# Patient Record
Sex: MALE | Race: WHITE | Employment: OTHER | URBAN - METROPOLITAN AREA
[De-identification: names, ages, dates, MRNs, and addresses within clinical notes are randomized per-mention and may not be internally consistent; named-entity substitution may affect disease eponyms.]

---

## 2017-02-16 RX ORDER — AMITRIPTYLINE HYDROCHLORIDE 25 MG/1
TABLET, FILM COATED ORAL
Qty: 30 TABLET | Refills: 0 | Status: SHIPPED | OUTPATIENT
Start: 2017-02-16 | End: 2017-03-13

## 2017-02-16 NOTE — TELEPHONE ENCOUNTER
Refill request for amitriptyline 25 mg, take 1 tab nightly, #30, no refills    LOV: 1/12/16  NOV: None

## 2017-03-13 RX ORDER — AMITRIPTYLINE HYDROCHLORIDE 25 MG/1
TABLET, FILM COATED ORAL
Qty: 30 TABLET | Refills: 0 | Status: SHIPPED | OUTPATIENT
Start: 2017-03-13 | End: 2017-04-19

## 2017-04-19 ENCOUNTER — PATIENT MESSAGE (OUTPATIENT)
Dept: INTERNAL MEDICINE CLINIC | Facility: CLINIC | Age: 47
End: 2017-04-19

## 2017-04-19 RX ORDER — AMITRIPTYLINE HYDROCHLORIDE 25 MG/1
TABLET, FILM COATED ORAL
Qty: 30 TABLET | Refills: 0 | Status: SHIPPED | OUTPATIENT
Start: 2017-04-19 | End: 2017-07-14

## 2017-04-19 NOTE — TELEPHONE ENCOUNTER
From: Mati Mcmahon  To: Ames Carrel, MD  Sent: 4/19/2017 4:30 PM CDT  Subject: Prescription Question    Good afternoon. Dr. Haleigh Martínez referred me to a Dr. Amy Dorsey for sleep apnea.  Dr. Amy Dorsey diagnosed me with periodic limb movement disorder instead and has had m

## 2017-04-19 NOTE — TELEPHONE ENCOUNTER
Per Dr. Maryse Ferguson written order \"refill prn, set dose + frequency from patient or pharmacy\". Patient is to see Dr. Brandon Mendez this summer. Attempt made to call patient and verify dose and frequency + pharmacy. No answer. LMTCB.

## 2017-04-19 NOTE — TELEPHONE ENCOUNTER
To Dr. Tae Barclay, please advise if you would like to refill carbidopa-levodopa or order referral for pt to f/u with Dr. Shari Sam?

## 2017-04-20 NOTE — TELEPHONE ENCOUNTER
Pt called back and verified dose of carbidopa-levodopa 25-100mg, 1 tab daily. eRx sent to pharm.  Call transferred to [facility] to schedule PE

## 2017-07-07 ENCOUNTER — TELEPHONE (OUTPATIENT)
Dept: INTERNAL MEDICINE CLINIC | Facility: CLINIC | Age: 47
End: 2017-07-07

## 2017-07-07 DIAGNOSIS — L98.9 SKIN ABNORMALITY: ICD-10-CM

## 2017-07-07 DIAGNOSIS — R23.4 CRACKED SKIN: Primary | ICD-10-CM

## 2017-07-07 NOTE — TELEPHONE ENCOUNTER
Pt requesting referral for appt  with Dr Flakita Rodríguez on Monday, 7/10/17 at 11:40  YPI#[phone]   Tasked to nursing

## 2017-07-07 NOTE — TELEPHONE ENCOUNTER
See 7/5/17 MyChart encounter. Referral generated and copy of the referral faxed to 's office.  To Dr.Cheff SALEH

## 2017-07-14 ENCOUNTER — OFFICE VISIT (OUTPATIENT)
Dept: INTERNAL MEDICINE CLINIC | Facility: CLINIC | Age: 47
End: 2017-07-14

## 2017-07-14 VITALS
WEIGHT: 232 LBS | DIASTOLIC BLOOD PRESSURE: 76 MMHG | BODY MASS INDEX: 30.75 KG/M2 | RESPIRATION RATE: 18 BRPM | HEART RATE: 62 BPM | OXYGEN SATURATION: 98 % | SYSTOLIC BLOOD PRESSURE: 118 MMHG | HEIGHT: 73 IN | TEMPERATURE: 99 F

## 2017-07-14 DIAGNOSIS — Z00.00 ANNUAL PHYSICAL EXAM: Primary | ICD-10-CM

## 2017-07-14 DIAGNOSIS — G47.61 PERIODIC LIMB MOVEMENT DISORDER: ICD-10-CM

## 2017-07-14 DIAGNOSIS — G47.30 SLEEP APNEA, UNSPECIFIED TYPE: ICD-10-CM

## 2017-07-14 DIAGNOSIS — F09 COGNITIVE DISORDER: ICD-10-CM

## 2017-07-14 PROCEDURE — G0438 PPPS, INITIAL VISIT: HCPCS | Performed by: INTERNAL MEDICINE

## 2017-07-14 PROCEDURE — 99396 PREV VISIT EST AGE 40-64: CPT | Performed by: INTERNAL MEDICINE

## 2017-07-14 RX ORDER — FLUOCINONIDE 0.5 MG/G
OINTMENT TOPICAL
Refills: 0 | COMMUNITY
Start: 2017-07-11 | End: 2017-10-12

## 2017-07-14 RX ORDER — OXICONAZOLE NITRATE 10 MG/G
CREAM TOPICAL
Refills: 0 | COMMUNITY
Start: 2017-07-13 | End: 2017-07-14

## 2017-07-14 RX ORDER — FINASTERIDE 5 MG/1
5 TABLET, FILM COATED ORAL DAILY
Refills: 0 | COMMUNITY
Start: 2017-07-10 | End: 2017-10-12

## 2017-07-14 NOTE — PROGRESS NOTES
Carlos Aragon is a 41year old male. HPI:   1. Annual physical exam - he is feeling much better generally since his last visit here about 2 yrs ago.  He had seen Dr Jeanie Mckenzie and was given Sinemet for periodic limb movement disorder and pt stopped this after Smokeless tobacco: Never Used                      Alcohol use:  No                 REVIEW OF SYSTEMS:   GENERAL HEALTH: feels well otherwise  SKIN: denies any unusual skin lesions or rashes  RESPIRATORY: denies shortness of breath with exertio (PHQ-2/PHQ-9): Over the LAST 2 WEEKS   Little interest or pleasure in doing things (over the last two weeks)?: Not at all    Feeling down, depressed, or hopeless (over the last two weeks)?: Not at all    PHQ-2 SCORE: 0        Advance Directives this or any previous visit. Update Immunization Activity if applicable    Tetanus No orders found for this or any previous visit.  Update Immunization Activity if applicable    Zoster (Not covered by Medicare Part B) No orders found for this or any previous

## 2017-07-15 ENCOUNTER — TELEPHONE (OUTPATIENT)
Dept: INTERNAL MEDICINE CLINIC | Facility: CLINIC | Age: 47
End: 2017-07-15

## 2017-07-15 NOTE — TELEPHONE ENCOUNTER
Please call Dr Payal Lieberman office. Can they check Dr Payal Lieberman notes: patient states that Dr Viviana Chavez told him he does not have sleep apnea, he has not been using CPAP for about 2 yrs.  I'm looking for clarification as to whether or not Dr Viviana Chavez feels he has sleep apnea or not

## 2017-07-17 NOTE — TELEPHONE ENCOUNTER
Called Dr. Sheng Stafford office. I was informed that patient hasn't been seen since 9/25/2015. I requested that MD's last progress note be faxed to our office. Note will be faxed today. Slim made aware to place on Dr. Campos Nations desk for review.      THERESE Garber

## 2017-08-03 NOTE — TELEPHONE ENCOUNTER
Pt can no longer see  because he is not in network. Pt needs a referral to see another neurologist. He also needs a refill on Amitriptyline 25 mg, 1 a day. (prescribed by ). Pt uses [employer].     Tasked to Nursing

## 2017-08-03 NOTE — TELEPHONE ENCOUNTER
Amitriptyline was DC'd on 7/14/2017, Okay to refill ?    Referral pended, please advise which neurologist you would like to refer to ?

## 2017-08-05 NOTE — TELEPHONE ENCOUNTER
Refill amitriptyline 25 mg, # 30, one at [facility], refill x 6. See me in 6 mo. Give neuro referral to whomever is in his network.

## 2017-08-07 NOTE — TELEPHONE ENCOUNTER
Called patient and relayed DR. EDWARDS message - verbalized understanding.  Referral for neuro pending - instructed patient to call insurance to find out who is covered - patient will call back

## 2017-10-11 NOTE — TELEPHONE ENCOUNTER
spoketo pt - per  note pt no longer takes;   Per pt he does still have \"bouts\" of periodic limb movement and he takes Sinemet;   He just picked up refill this week and does not need med currently

## 2017-10-12 ENCOUNTER — APPOINTMENT (OUTPATIENT)
Dept: LAB | Age: 47
End: 2017-10-12
Attending: INTERNAL MEDICINE
Payer: COMMERCIAL

## 2017-10-12 ENCOUNTER — OFFICE VISIT (OUTPATIENT)
Dept: INTERNAL MEDICINE CLINIC | Facility: CLINIC | Age: 47
End: 2017-10-12

## 2017-10-12 VITALS
WEIGHT: 222.81 LBS | DIASTOLIC BLOOD PRESSURE: 76 MMHG | HEART RATE: 72 BPM | OXYGEN SATURATION: 98 % | SYSTOLIC BLOOD PRESSURE: 112 MMHG | BODY MASS INDEX: 29.53 KG/M2 | HEIGHT: 73 IN | TEMPERATURE: 98 F

## 2017-10-12 DIAGNOSIS — Z00.00 ANNUAL PHYSICAL EXAM: ICD-10-CM

## 2017-10-12 DIAGNOSIS — G47.61 PERIODIC LIMB MOVEMENT DISORDER: ICD-10-CM

## 2017-10-12 DIAGNOSIS — N40.0 BENIGN PROSTATIC HYPERPLASIA, UNSPECIFIED WHETHER LOWER URINARY TRACT SYMPTOMS PRESENT: ICD-10-CM

## 2017-10-12 DIAGNOSIS — L73.9 FOLLICULITIS: Primary | ICD-10-CM

## 2017-10-12 PROCEDURE — 36415 COLL VENOUS BLD VENIPUNCTURE: CPT

## 2017-10-12 PROCEDURE — 99214 OFFICE O/P EST MOD 30 MIN: CPT | Performed by: INTERNAL MEDICINE

## 2017-10-12 PROCEDURE — 90471 IMMUNIZATION ADMIN: CPT | Performed by: INTERNAL MEDICINE

## 2017-10-12 PROCEDURE — 81015 MICROSCOPIC EXAM OF URINE: CPT

## 2017-10-12 PROCEDURE — 90686 IIV4 VACC NO PRSV 0.5 ML IM: CPT | Performed by: INTERNAL MEDICINE

## 2017-10-12 PROCEDURE — 99212 OFFICE O/P EST SF 10 MIN: CPT | Performed by: INTERNAL MEDICINE

## 2017-10-12 RX ORDER — AMITRIPTYLINE HYDROCHLORIDE 25 MG/1
25 TABLET, FILM COATED ORAL NIGHTLY
Qty: 30 TABLET | Refills: 5 | Status: SHIPPED | OUTPATIENT
Start: 2017-10-12 | End: 2018-04-10

## 2017-10-12 RX ORDER — FINASTERIDE 5 MG/1
5 TABLET, FILM COATED ORAL DAILY
Qty: 30 TABLET | Refills: 5 | Status: SHIPPED | OUTPATIENT
Start: 2017-10-12 | End: 2019-02-08

## 2017-10-12 RX ORDER — SULFAMETHOXAZOLE AND TRIMETHOPRIM 800; 160 MG/1; MG/1
1 TABLET ORAL 2 TIMES DAILY
Qty: 20 TABLET | Refills: 0 | Status: SHIPPED | OUTPATIENT
Start: 2017-10-12 | End: 2018-08-06

## 2017-12-27 ENCOUNTER — PATIENT MESSAGE (OUTPATIENT)
Dept: INTERNAL MEDICINE CLINIC | Facility: CLINIC | Age: 47
End: 2017-12-27

## 2017-12-27 NOTE — TELEPHONE ENCOUNTER
From: Carlos Aragon  To: Darcy Dietrich MD  Sent: 12/27/2017 12:47 PM CST  Subject: Prescription Question    Good afternoon, Dr. Ines Payton. Happy holidays. I have been down with a pretty bad sinus infection since Christmas Eve.  I've been taking Maximum Str

## 2017-12-27 NOTE — TELEPHONE ENCOUNTER
Sabra Basurto   to Dia Sanchez MD           12/27/17 12:47 PM   Good afternoon, Dr. Joss Hopkins. Happy holidays. I have been down with a pretty bad sinus infection since Christmas Eve.  I've been taking Maximum Strength Mucinex to help alleviate the symptom

## 2017-12-27 NOTE — TELEPHONE ENCOUNTER
Please advise - haider-smooth shows high warning  -pending , also cough medicine due to allergy to erythromycin. Patient states Carbidopa was first given by DR. Vivar for periodic limb movement -refilled to DR. EDWARDS

## 2017-12-27 NOTE — TELEPHONE ENCOUNTER
1) DANIEL rebollar; if he is coughing then phen c cod    2) refill carbidopa as ordered - ask him who first gave him this and for what purpose. Can refill for 1 yr if answer sound reasonable.

## 2017-12-28 RX ORDER — PROMETHAZINE HYDROCHLORIDE AND CODEINE PHOSPHATE 6.25; 1 MG/5ML; MG/5ML
2.5 SYRUP ORAL EVERY 4 HOURS PRN
Qty: 180 ML | Refills: 0 | COMMUNITY
Start: 2017-12-27 | End: 2018-08-06

## 2017-12-28 RX ORDER — DOXYCYCLINE HYCLATE 100 MG/1
100 CAPSULE ORAL 2 TIMES DAILY
Qty: 14 CAPSULE | Refills: 0 | Status: SHIPPED | OUTPATIENT
Start: 2017-12-28 | End: 2018-08-06

## 2017-12-28 RX ORDER — AZITHROMYCIN 250 MG/1
TABLET, FILM COATED ORAL
Qty: 6 TABLET | Refills: 0 | Status: CANCELLED | OUTPATIENT
Start: 2017-12-28

## 2017-12-28 NOTE — TELEPHONE ENCOUNTER
Pt is checking on the status of medication that was supposed to be called  Pt said he is not trying to be a nuisance he has a family member that will pick it up today  Please call pt [phone]    Tasked to nursing high

## 2017-12-28 NOTE — TELEPHONE ENCOUNTER
Spoke to pt - he confirms he has severe rxn to Erythromycin and does not remember taking Elmer Bun in 2015 therefore we cannot send in Elmer Bun;   Pt has not had issues with Doxycycline in the past;   Rx changed and discussed  With pt

## 2018-04-10 RX ORDER — AMITRIPTYLINE HYDROCHLORIDE 25 MG/1
25 TABLET, FILM COATED ORAL NIGHTLY
Qty: 90 TABLET | Refills: 3 | Status: SHIPPED | OUTPATIENT
Start: 2018-04-10 | End: 2019-02-18

## 2018-04-10 NOTE — TELEPHONE ENCOUNTER
Patient called to say that he is leaving for out of town first thing Thursday morning, therefore he needs this done by tomorrow afternoon.

## 2018-06-29 NOTE — TELEPHONE ENCOUNTER
Please advise - called patient who states he needs referral for psychologist . He has PLM and has cognitive impairment and needs psychologist for help -referral is pending . He can only see one associated with [facility] , also needs DX thanks - to DR. EDWARDS

## 2018-06-29 NOTE — TELEPHONE ENCOUNTER
Pt would like to change their diagnosis after talking to [place]. Best call back is [phone]. Tasked to nursing.

## 2018-08-13 ENCOUNTER — PATIENT MESSAGE (OUTPATIENT)
Dept: INTERNAL MEDICINE CLINIC | Facility: CLINIC | Age: 48
End: 2018-08-13

## 2018-08-15 NOTE — TELEPHONE ENCOUNTER
From: Ervin Jarquin  To: Yordy Rodas MD  Sent: 8/13/2018 1:06 PM CDT  Subject: Other    Dear member of Dr. Logan Jameson office:    I have a favor to ask regarding adding some absent information to my EMR chart.     Currently, my electronic records in [address] in approximately 2012. It was from data in the very first sleep study conducted in or around 2008 that I provided in 2016 to Dr. Jennifer Morton who then noticed readings that led him to believe I have PLM and not sleep apnea.     When time permits, if you could update

## 2019-02-04 ENCOUNTER — OFFICE VISIT (OUTPATIENT)
Dept: INTERNAL MEDICINE CLINIC | Facility: CLINIC | Age: 49
End: 2019-02-04

## 2019-02-04 ENCOUNTER — PATIENT MESSAGE (OUTPATIENT)
Dept: INTERNAL MEDICINE CLINIC | Facility: CLINIC | Age: 49
End: 2019-02-04

## 2019-02-04 ENCOUNTER — LAB ENCOUNTER (OUTPATIENT)
Dept: LAB | Age: 49
End: 2019-02-04
Attending: INTERNAL MEDICINE
Payer: COMMERCIAL

## 2019-02-04 VITALS
SYSTOLIC BLOOD PRESSURE: 110 MMHG | DIASTOLIC BLOOD PRESSURE: 78 MMHG | OXYGEN SATURATION: 98 % | BODY MASS INDEX: 29.03 KG/M2 | HEART RATE: 81 BPM | WEIGHT: 219 LBS | TEMPERATURE: 98 F | HEIGHT: 73 IN

## 2019-02-04 DIAGNOSIS — R10.13 EPIGASTRIC PAIN: ICD-10-CM

## 2019-02-04 DIAGNOSIS — Z00.00 ANNUAL PHYSICAL EXAM: Primary | ICD-10-CM

## 2019-02-04 DIAGNOSIS — R73.9 ELEVATED BLOOD SUGAR: ICD-10-CM

## 2019-02-04 DIAGNOSIS — Z00.00 ANNUAL PHYSICAL EXAM: ICD-10-CM

## 2019-02-04 DIAGNOSIS — G47.61 PERIODIC LIMB MOVEMENT DISORDER: ICD-10-CM

## 2019-02-04 DIAGNOSIS — R07.89 OTHER CHEST PAIN: ICD-10-CM

## 2019-02-04 LAB
ALBUMIN SERPL BCP-MCNC: 4.1 G/DL (ref 3.5–4.8)
ALBUMIN/GLOB SERPL: 1.3 {RATIO} (ref 1–2)
ALP SERPL-CCNC: 41 U/L (ref 32–100)
ALT SERPL-CCNC: 22 U/L (ref 17–63)
ANION GAP SERPL CALC-SCNC: 9 MMOL/L (ref 0–18)
AST SERPL-CCNC: 26 U/L (ref 15–41)
BACTERIA UR QL AUTO: NEGATIVE /HPF
BASOPHILS # BLD AUTO: 0.04 X10(3) UL (ref 0–0.2)
BASOPHILS NFR BLD AUTO: 0.8 %
BILIRUB SERPL-MCNC: 0.6 MG/DL (ref 0.3–1.2)
BILIRUB UR QL: NEGATIVE
BUN SERPL-MCNC: 11 MG/DL (ref 8–20)
BUN/CREAT SERPL: 13.8 (ref 10–20)
CALCIUM SERPL-MCNC: 9.2 MG/DL (ref 8.5–10.5)
CHLORIDE SERPL-SCNC: 102 MMOL/L (ref 95–110)
CHOLEST SERPL-MCNC: 183 MG/DL (ref 110–200)
CLARITY UR: CLEAR
CO2 SERPL-SCNC: 28 MMOL/L (ref 22–32)
COLOR UR: YELLOW
COMPLEXED PSA SERPL-MCNC: 0.2 NG/ML (ref 0.01–4)
CREAT SERPL-MCNC: 0.8 MG/DL (ref 0.5–1.5)
DEPRECATED RDW RBC AUTO: 39.9 FL (ref 35.1–46.3)
EOSINOPHIL # BLD AUTO: 0.08 X10(3) UL (ref 0–0.7)
EOSINOPHIL NFR BLD AUTO: 1.6 %
ERYTHROCYTE [DISTWIDTH] IN BLOOD BY AUTOMATED COUNT: 11.5 % (ref 11–15)
EST. AVERAGE GLUCOSE BLD GHB EST-MCNC: 108 MG/DL (ref 68–126)
GLOBULIN PLAS-MCNC: 3.2 G/DL (ref 2.5–3.7)
GLUCOSE SERPL-MCNC: 111 MG/DL (ref 70–99)
GLUCOSE UR-MCNC: NEGATIVE MG/DL
HBA1C MFR BLD HPLC: 5.4 % (ref ?–5.7)
HCT VFR BLD AUTO: 44.5 % (ref 39–53)
HDLC SERPL-MCNC: 52 MG/DL
HGB BLD-MCNC: 14.7 G/DL (ref 13–17.5)
HGB UR QL STRIP.AUTO: NEGATIVE
IMM GRANULOCYTES # BLD AUTO: 0.01 X10(3) UL (ref 0–1)
IMM GRANULOCYTES NFR BLD: 0.2 %
KETONES UR-MCNC: NEGATIVE MG/DL
LDLC SERPL CALC-MCNC: 124 MG/DL (ref 0–99)
LEUKOCYTE ESTERASE UR QL STRIP.AUTO: NEGATIVE
LYMPHOCYTES # BLD AUTO: 1.44 X10(3) UL (ref 1–4)
LYMPHOCYTES NFR BLD AUTO: 29.5 %
MCH RBC QN AUTO: 31.5 PG (ref 26–34)
MCHC RBC AUTO-ENTMCNC: 33 G/DL (ref 31–37)
MCV RBC AUTO: 95.3 FL (ref 80–100)
MONOCYTES # BLD AUTO: 0.3 X10(3) UL (ref 0.1–1)
MONOCYTES NFR BLD AUTO: 6.1 %
NEUTROPHILS # BLD AUTO: 3.01 X10 (3) UL (ref 1.5–7.7)
NEUTROPHILS # BLD AUTO: 3.01 X10(3) UL (ref 1.5–7.7)
NEUTROPHILS NFR BLD AUTO: 61.8 %
NITRITE UR QL STRIP.AUTO: NEGATIVE
NONHDLC SERPL-MCNC: 131 MG/DL
OSMOLALITY UR CALC.SUM OF ELEC: 288 MOSM/KG (ref 275–295)
PATIENT FASTING: YES
PH UR: 5 [PH] (ref 5–8)
PLATELET # BLD AUTO: 246 10(3)UL (ref 150–450)
POTASSIUM SERPL-SCNC: 4.8 MMOL/L (ref 3.3–5.1)
PROT SERPL-MCNC: 7.3 G/DL (ref 5.9–8.4)
PROT UR-MCNC: NEGATIVE MG/DL
PSA SERPL-MCNC: 0.2 NG/ML (ref 0–4)
RBC # BLD AUTO: 4.67 X10(6)UL (ref 4.3–5.7)
RBC #/AREA URNS AUTO: 1 /HPF
SODIUM SERPL-SCNC: 139 MMOL/L (ref 136–144)
SP GR UR STRIP: 1.01 (ref 1–1.03)
TRIGL SERPL-MCNC: 33 MG/DL (ref 1–149)
TSH SERPL-ACNC: 0.62 UIU/ML (ref 0.45–5.33)
UROBILINOGEN UR STRIP-ACNC: <2
VIT C UR-MCNC: NEGATIVE MG/DL
WBC # BLD AUTO: 4.9 X10(3) UL (ref 4–11)
WBC #/AREA URNS AUTO: 0 /HPF

## 2019-02-04 PROCEDURE — 80053 COMPREHEN METABOLIC PANEL: CPT

## 2019-02-04 PROCEDURE — 80061 LIPID PANEL: CPT

## 2019-02-04 PROCEDURE — 36415 COLL VENOUS BLD VENIPUNCTURE: CPT

## 2019-02-04 PROCEDURE — 99396 PREV VISIT EST AGE 40-64: CPT | Performed by: INTERNAL MEDICINE

## 2019-02-04 PROCEDURE — 85025 COMPLETE CBC W/AUTO DIFF WBC: CPT

## 2019-02-04 PROCEDURE — 81015 MICROSCOPIC EXAM OF URINE: CPT

## 2019-02-04 PROCEDURE — 81001 URINALYSIS AUTO W/SCOPE: CPT

## 2019-02-04 PROCEDURE — 84443 ASSAY THYROID STIM HORMONE: CPT

## 2019-02-04 PROCEDURE — 83036 HEMOGLOBIN GLYCOSYLATED A1C: CPT

## 2019-02-04 NOTE — TELEPHONE ENCOUNTER
From: Anthony Fletcher  To: Catherine Lofton MD  Sent: 2/4/2019 11:57 AM CST  Subject: Prescription Question    Dear Dr. Leo Ponce,    In light of the recent gastric and chest pains, I completely forgot to mention one of the original reasons I made the appointment

## 2019-02-04 NOTE — PROGRESS NOTES
Fernando Arrieta is a 57year old male. HPI:   He is here for annual physical.     He has had epigastric pain for the last 2 weeks, almost daily, 4/10 - no NVD. Eating does not change pain. Pain can make it difficult to sleep.      He has questions about varico (1.854 m)   Wt 219 lb (99.3 kg)   SpO2 98%   BMI 28.89 kg/m²   GENERAL: well developed, well nourished,in no apparent distress  SKIN: no rashes,no suspicious lesions  HEENT: atraumatic, normocephalic,ears and throat are clear  NECK: supple,no adenopathy,no

## 2019-02-08 ENCOUNTER — PATIENT MESSAGE (OUTPATIENT)
Dept: INTERNAL MEDICINE CLINIC | Facility: CLINIC | Age: 49
End: 2019-02-08

## 2019-02-08 RX ORDER — FINASTERIDE 5 MG/1
5 TABLET, FILM COATED ORAL DAILY
Qty: 90 TABLET | Refills: 3 | Status: SHIPPED | OUTPATIENT
Start: 2019-02-08 | End: 2020-02-24

## 2019-02-08 NOTE — TELEPHONE ENCOUNTER
From: Elizabeth Dawson  To: Vivian Gomes MD  Sent: 2/8/2019 3:17 PM CST  Subject: Prescription Question    Dear Laurette Seip RN:    Per your request, at this time, I am contacting you to request the following Rx refills through [address] [place] [address] ([address]

## 2019-02-09 ENCOUNTER — TELEPHONE (OUTPATIENT)
Dept: INTERNAL MEDICINE CLINIC | Facility: CLINIC | Age: 49
End: 2019-02-09

## 2019-02-11 NOTE — TELEPHONE ENCOUNTER
Spoke to patient and relayed MD message, patient verbalizes understanding. Patient inquired about another message he had sent via email for refills on his carbidopa-levodopa and finasteride.  Per email on 2/8/19, It looks as though refills for those [facility]

## 2019-02-20 RX ORDER — AMITRIPTYLINE HYDROCHLORIDE 25 MG/1
25 TABLET, FILM COATED ORAL NIGHTLY
Qty: 90 TABLET | Refills: 3 | Status: SHIPPED | OUTPATIENT
Start: 2019-02-20 | End: 2019-08-15

## 2019-03-21 NOTE — TELEPHONE ENCOUNTER
Pt called re: carbidopa - levodopa prescription  Has no available refills & is out of medication  States he needs refill today - \"is really hurting\"  Had his physical w/Dr Pandey on 2-4-19 & thought Rx was sent in at that time with additional refills    P

## 2019-06-03 ENCOUNTER — TELEPHONE (OUTPATIENT)
Dept: INTERNAL MEDICINE CLINIC | Facility: CLINIC | Age: 49
End: 2019-06-03

## 2019-08-15 NOTE — PROGRESS NOTES
Rob Melenedz is a 54year old male. Patient presents with: Anxiety: Here today to discuss recent anxiety / stress has increased, caring for family.  Also noticing difficulty sleeping, headaches etc.       HPI:   Rob Melendez is a 54year old male who presents (99.8 kg)   SpO2 98%   BMI 29.03 kg/m²     GENERAL: well developed, well nourished, in no apparent distress      ASSESSMENT AND PLAN:     1.  Anxiety  Advised to increase his elavil t 50mg nightly as this can help with sleep and also with migraine preventio

## 2019-10-08 ENCOUNTER — OFFICE VISIT (OUTPATIENT)
Dept: INTERNAL MEDICINE CLINIC | Facility: CLINIC | Age: 49
End: 2019-10-08

## 2019-10-08 ENCOUNTER — LAB ENCOUNTER (OUTPATIENT)
Dept: LAB | Age: 49
End: 2019-10-08
Attending: INTERNAL MEDICINE
Payer: COMMERCIAL

## 2019-10-08 VITALS
BODY MASS INDEX: 28.49 KG/M2 | SYSTOLIC BLOOD PRESSURE: 110 MMHG | OXYGEN SATURATION: 98 % | TEMPERATURE: 98 F | DIASTOLIC BLOOD PRESSURE: 78 MMHG | WEIGHT: 215 LBS | HEIGHT: 73 IN | HEART RATE: 81 BPM

## 2019-10-08 DIAGNOSIS — R10.13 EPIGASTRIC PAIN: ICD-10-CM

## 2019-10-08 DIAGNOSIS — R10.13 EPIGASTRIC PAIN: Primary | ICD-10-CM

## 2019-10-08 PROCEDURE — 90471 IMMUNIZATION ADMIN: CPT | Performed by: INTERNAL MEDICINE

## 2019-10-08 PROCEDURE — 99214 OFFICE O/P EST MOD 30 MIN: CPT | Performed by: INTERNAL MEDICINE

## 2019-10-08 PROCEDURE — 80053 COMPREHEN METABOLIC PANEL: CPT

## 2019-10-08 PROCEDURE — 85025 COMPLETE CBC W/AUTO DIFF WBC: CPT

## 2019-10-08 PROCEDURE — 90686 IIV4 VACC NO PRSV 0.5 ML IM: CPT | Performed by: INTERNAL MEDICINE

## 2019-10-08 PROCEDURE — 36415 COLL VENOUS BLD VENIPUNCTURE: CPT

## 2019-10-08 RX ORDER — PANTOPRAZOLE SODIUM 40 MG/1
40 TABLET, DELAYED RELEASE ORAL
Qty: 30 TABLET | Refills: 2 | Status: SHIPPED | OUTPATIENT
Start: 2019-10-08 | End: 2020-02-26

## 2019-10-08 NOTE — PROGRESS NOTES
Re Roman is a 54year old male. HPI:   He was here in Feb with epigastric pain and CT and upper endo was rec but pt did not follow through. He has had variable discomfort since then - beer seems to make it worse;  occas nocturnal pain. No NVD.  During t Alcohol/week: 0.0 standard drinks    Drug use: No       REVIEW OF SYSTEMS:   GENERAL HEALTH: feels well otherwise  SKIN: denies any unusual skin lesions or rashes  RESPIRATORY: denies shortness of breath with exertion  CARDIOVASCULAR: denies chest pain on

## 2019-10-09 ENCOUNTER — TELEPHONE (OUTPATIENT)
Dept: INTERNAL MEDICINE CLINIC | Facility: CLINIC | Age: 49
End: 2019-10-09

## 2019-10-09 DIAGNOSIS — Z12.11 SCREENING FOR COLON CANCER: Primary | ICD-10-CM

## 2019-10-09 NOTE — TELEPHONE ENCOUNTER
Called and Relayed MD's message to patient---verbalized understanding  THERESE: he plans to drop off hemoccult card late this week or early next week. He will include an envelope for Dr. Jocelin Nance that contains photos of his bowel movements.

## 2019-10-12 ENCOUNTER — TELEPHONE (OUTPATIENT)
Dept: INTERNAL MEDICINE CLINIC | Facility: CLINIC | Age: 49
End: 2019-10-12

## 2019-10-16 ENCOUNTER — APPOINTMENT (OUTPATIENT)
Dept: LAB | Age: 49
End: 2019-10-16
Attending: INTERNAL MEDICINE
Payer: COMMERCIAL

## 2019-10-16 DIAGNOSIS — Z12.11 SCREENING FOR COLON CANCER: ICD-10-CM

## 2019-10-16 PROCEDURE — 82270 OCCULT BLOOD FECES: CPT

## 2019-10-17 NOTE — TELEPHONE ENCOUNTER
Macy Robertson / [address] [facility] EXT [address]  Pt dropped off Hemocult Card at the lab there is no order  Please enter order and call Macy Robertson when complete  [phone]    Tasked to nursing high

## 2019-10-25 ENCOUNTER — TELEPHONE (OUTPATIENT)
Dept: INTERNAL MEDICINE CLINIC | Facility: CLINIC | Age: 49
End: 2019-10-25

## 2019-10-25 NOTE — TELEPHONE ENCOUNTER
I spoke with patient and relayed Dr. Reed Dumont message. He verbalized understanding. He asked why he should have the CT done. He asked for a little more information from Dr. Dearl Ormond to understand why a CT is needed at this time.  He is concerned about the cost

## 2019-10-28 NOTE — TELEPHONE ENCOUNTER
I ordered CT abdo because of his abdo discomfort. Cheapest impaging is [facility], [place], let me know and I will fax order.  CT should be in range of $300

## 2019-10-29 NOTE — TELEPHONE ENCOUNTER
Patient called and relayed Dr Gordon Guerra message. Patient verbalized understanding and will call [facility] and inquire about pricing prior to scheduling CT. CT order faxed to [facility] in [facility]. Phone # [phone].  Patient re

## 2019-10-31 NOTE — TELEPHONE ENCOUNTER
To [facility] care - [facility] called Mady  Who states the referral for CT has to be made pending . She can be reached at [phone] or [phone].  Patient knows about this , he also will go to [facility] or [facility] - please let patient and clinica

## 2019-10-31 NOTE — TELEPHONE ENCOUNTER
Managed Care, does patient need PA for CT abdomen+pelvis ordered 2/4/19? If so can we get this started.  Thank you

## 2019-10-31 NOTE — TELEPHONE ENCOUNTER
Please note that patient has [facility] HMO and is unable to have CT done outside of [address] [place] [address]. Please advise if patient will be paying out of pocket, if so patient will only need an order. Thank you, David Garcia Specialist    Managed Care.

## 2019-11-01 NOTE — TELEPHONE ENCOUNTER
Spoke to Ty in [facility]. Referral is not needed for CTs. Patient only needs order and go to [facility] AND [facility] facilities.

## 2019-11-03 ENCOUNTER — PATIENT MESSAGE (OUTPATIENT)
Dept: INTERNAL MEDICINE CLINIC | Facility: CLINIC | Age: 49
End: 2019-11-03

## 2019-11-04 NOTE — TELEPHONE ENCOUNTER
S/w patient and relayed imani message below. He states that he has been calling his insurance and has been getting different answers r/t cost. He was told that he cannot go to [facility] as it is out of network.  He is also being told that [address] [place] [address] is coded

## 2019-11-04 NOTE — TELEPHONE ENCOUNTER
Spoke with patient explained that he doesn't need a referral to have CT done at [place] or [facility].  Patient still unsure if he needs to pay out of pocket cost. Informed patient to call Hospital billing to find out how much a CT would cost for his heal

## 2019-11-05 NOTE — TELEPHONE ENCOUNTER
Patient called. Explained that he may go to [facility], but would not be able to file with insurance. Would be cash paying patient. Insurance will only cover services at [facility].  Patient understood and will contact [facility].

## 2019-11-05 NOTE — TELEPHONE ENCOUNTER
Copied pt's [employer] message to the 10/25/2019 encounter. Please continue messages there.

## 2019-11-05 NOTE — TELEPHONE ENCOUNTER
Rocio Garcia   to Darcy Dietrich MD            11/3/19 2:36 PM   My thanks to the admins in your office to alerting me to the fact my HMO should not be charging for CT scans. You were right.  I just need to find an imaging center outside my health group t

## 2019-11-06 NOTE — TELEPHONE ENCOUNTER
CT order faxed to [facility] at . Fax confirmation received. I spoke with patient to let him know this was done. He verbalized understanding.

## 2019-11-06 NOTE — TELEPHONE ENCOUNTER
Pt will be going to [facility]  He has an appt tomorrow/Nov 7  at 11am     Please send order for CT with diagnosis to fax#[phone] Attn: Rex Birmingham

## 2019-11-07 ENCOUNTER — PATIENT MESSAGE (OUTPATIENT)
Dept: INTERNAL MEDICINE CLINIC | Facility: CLINIC | Age: 49
End: 2019-11-07

## 2019-11-07 ENCOUNTER — TELEPHONE (OUTPATIENT)
Dept: INTERNAL MEDICINE CLINIC | Facility: CLINIC | Age: 49
End: 2019-11-07

## 2019-11-07 NOTE — TELEPHONE ENCOUNTER
Received the following MyChart message from patient today:    From: Jose Nixon  To: Thalia Felder. Becky Carr MD  Sent: 11/7/2019  1:07 PM CST  Subject: Other    Good afternoon, Dr. Becky Carr.     I went this morning to get the abdomen and pelvic CT (w/ and w/out contr

## 2019-11-07 NOTE — TELEPHONE ENCOUNTER
From: Mati Mcmahon  To: Karla Norman. Haleigh Martínez MD  Sent: 11/7/2019 1:07 PM CST  Subject: Other    Good afternoon, Dr. Haleigh Martínez. I went this morning to get the abdomen and pelvic CT (w/ and w/out contrast) per your direction.  [facility] in [facility] said y

## 2019-11-08 RX ORDER — PANTOPRAZOLE SODIUM 40 MG/1
40 TABLET, DELAYED RELEASE ORAL
Qty: 30 TABLET | Refills: 98 | OUTPATIENT
Start: 2019-11-08

## 2019-11-11 NOTE — TELEPHONE ENCOUNTER
I spoke with patient and relayed Dr. Isaiah Orellana message. He verbalized understanding. He will call [facility]. I provided him with Dr. Isaiah Orellana fax number. He verbalized understanding.

## 2019-11-11 NOTE — TELEPHONE ENCOUNTER
I cannot access surgery from [facility]. He could call the hospital and asked them to forward me this report    He can either send photo in to my chart or bring in when I see him in December.

## 2019-11-12 ENCOUNTER — PATIENT MESSAGE (OUTPATIENT)
Dept: INTERNAL MEDICINE CLINIC | Facility: CLINIC | Age: 49
End: 2019-11-12

## 2019-11-12 ENCOUNTER — TELEPHONE (OUTPATIENT)
Dept: INTERNAL MEDICINE CLINIC | Facility: CLINIC | Age: 49
End: 2019-11-12

## 2019-11-12 NOTE — TELEPHONE ENCOUNTER
Alexandra Newman   to Harsh Jain MD            11/12/19 12:10 PM   Per Dr. William Jimenez request, I contacted [facility] Records Department and asked they forward all pertinent documents concerning the emergency Lysis of Adhesions surgery I had a few years ago

## 2019-11-12 NOTE — TELEPHONE ENCOUNTER
From: lAison Balderas  To: Eric Aviles.  Carly Thompson MD  Sent: 11/12/2019 12:10 PM CST  Subject: Other    Per Dr. Arcadio Rooney request, I contacted [facility] Records Department and asked they forward all pertinent documents concerning the emergency Lysis of Adhesions surge

## 2019-11-18 ENCOUNTER — TELEPHONE (OUTPATIENT)
Dept: INTERNAL MEDICINE CLINIC | Facility: CLINIC | Age: 49
End: 2019-11-18

## 2019-11-18 NOTE — TELEPHONE ENCOUNTER
Please notify patient that I was able to review the report from the radiologist who read his CAT scan at [facility]. It appears to be a relatively benign report. The impression was a 8 mm simple cyst in the liver. This is no concern.   These are f

## 2019-11-18 NOTE — TELEPHONE ENCOUNTER
Called patient and relayed DR. SANDOVAL message - he verbalized understanding .  Report put on [facility] desk

## 2019-11-18 NOTE — TELEPHONE ENCOUNTER
Patient called for results of Ct Abdomen/Pelvis done 11/7/2019 at [facility]. Patient would like call back with results today. Does not want to wait for Dr. Levy Garber return this Friday.     Best number to contact patient at [phone]    Results placed on D

## 2019-12-16 ENCOUNTER — TELEPHONE (OUTPATIENT)
Dept: INTERNAL MEDICINE CLINIC | Facility: CLINIC | Age: 49
End: 2019-12-16

## 2019-12-16 NOTE — TELEPHONE ENCOUNTER
Pt. Is calling to see if Dr. Delphine Samano has received surgical notes that were requested from [facility] please advise ph.  # [phone]   Routed to clinical

## 2019-12-20 ENCOUNTER — TELEPHONE (OUTPATIENT)
Dept: INTERNAL MEDICINE CLINIC | Facility: CLINIC | Age: 49
End: 2019-12-20

## 2019-12-20 ENCOUNTER — OFFICE VISIT (OUTPATIENT)
Dept: INTERNAL MEDICINE CLINIC | Facility: CLINIC | Age: 49
End: 2019-12-20

## 2019-12-20 VITALS
OXYGEN SATURATION: 98 % | TEMPERATURE: 98 F | WEIGHT: 214 LBS | SYSTOLIC BLOOD PRESSURE: 118 MMHG | BODY MASS INDEX: 28.36 KG/M2 | HEART RATE: 70 BPM | DIASTOLIC BLOOD PRESSURE: 78 MMHG | HEIGHT: 73 IN

## 2019-12-20 DIAGNOSIS — Z12.83 SCREENING EXAM FOR SKIN CANCER: ICD-10-CM

## 2019-12-20 DIAGNOSIS — R10.13 EPIGASTRIC PAIN: Primary | ICD-10-CM

## 2019-12-20 DIAGNOSIS — R10.9 ABDOMINAL PAIN, UNSPECIFIED ABDOMINAL LOCATION: Primary | ICD-10-CM

## 2019-12-20 DIAGNOSIS — R10.33 PERIUMBILICAL ABDOMINAL PAIN: ICD-10-CM

## 2019-12-20 PROCEDURE — 99214 OFFICE O/P EST MOD 30 MIN: CPT | Performed by: INTERNAL MEDICINE

## 2019-12-20 RX ORDER — DICYCLOMINE HYDROCHLORIDE 10 MG/1
10 CAPSULE ORAL 4 TIMES DAILY
Qty: 40 CAPSULE | Refills: 5 | Status: SHIPPED | OUTPATIENT
Start: 2019-12-20 | End: 2019-12-30

## 2019-12-20 NOTE — TELEPHONE ENCOUNTER
Please put in a referral for Dr. Decker, general dermatologic examination and referral to Dr. Alma Rosa Sheridan, diagnosis abdominal pain

## 2019-12-20 NOTE — PROGRESS NOTES
Allen Gonzalez is a 54year old male. HPI:   Review of old records from [address] [place] [address] [address] - laparotomy found adhesions. He continues to have variable abdominal discomfort which he notes is particularly after beer or perhaps certain beers.   He also notes cert Alcohol use: No      Alcohol/week: 0.0 standard drinks    Drug use: No       REVIEW OF SYSTEMS:   GENERAL HEALTH: feels well otherwise  SKIN: denies any unusual skin lesions or rashes  RESPIRATORY: denies shortness of breath with exertion  CARDIOVASCULAR:

## 2019-12-20 NOTE — TELEPHONE ENCOUNTER
Called patient and relayed message that referrals have been entered and authorized - verbalized understanding .  Has seen them before

## 2019-12-26 ENCOUNTER — TELEPHONE (OUTPATIENT)
Dept: INTERNAL MEDICINE CLINIC | Facility: CLINIC | Age: 49
End: 2019-12-26

## 2019-12-26 NOTE — TELEPHONE ENCOUNTER
To Dr. 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 Last tdap: 10/27/16, flu shot: 10/8/19   Pt had 1 immunization at [facility] - will call there for name/date. 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

## 2019-12-26 NOTE — TELEPHONE ENCOUNTER
He would need proof of immunization to measles, mumps, rubella or get antibody titers to these to prove immunity or just get MMR vaccine. He would need CXR,    Some health car facilities would require immunization to Hepatitis  B - I would wait on this uniitl he gets hired.

## 2019-12-26 NOTE — TELEPHONE ENCOUNTER
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

## 2019-12-27 NOTE — TELEPHONE ENCOUNTER
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 They require through immunization; the same as clinical staff. Pt will f/u with employer to see what exactly is needed. He will call back on Monday.

## 2020-02-24 RX ORDER — FINASTERIDE 5 MG/1
5 TABLET, FILM COATED ORAL DAILY
Qty: 90 TABLET | Refills: 3 | Status: SHIPPED | OUTPATIENT
Start: 2020-02-24 | End: 2020-02-26

## 2020-02-26 ENCOUNTER — OFFICE VISIT (OUTPATIENT)
Dept: INTERNAL MEDICINE CLINIC | Facility: CLINIC | Age: 50
End: 2020-02-26

## 2020-02-26 VITALS
TEMPERATURE: 99 F | RESPIRATION RATE: 16 BRPM | OXYGEN SATURATION: 99 % | WEIGHT: 214 LBS | BODY MASS INDEX: 28 KG/M2 | SYSTOLIC BLOOD PRESSURE: 120 MMHG | DIASTOLIC BLOOD PRESSURE: 72 MMHG | HEART RATE: 89 BPM

## 2020-02-26 DIAGNOSIS — R10.33 PERIUMBILICAL ABDOMINAL PAIN: ICD-10-CM

## 2020-02-26 DIAGNOSIS — R10.13 EPIGASTRIC PAIN: ICD-10-CM

## 2020-02-26 DIAGNOSIS — Z00.00 ANNUAL PHYSICAL EXAM: ICD-10-CM

## 2020-02-26 DIAGNOSIS — G47.61 PERIODIC LIMB MOVEMENT DISORDER: ICD-10-CM

## 2020-02-26 DIAGNOSIS — E78.00 HYPERCHOLESTEREMIA: Primary | ICD-10-CM

## 2020-02-26 PROCEDURE — G0438 PPPS, INITIAL VISIT: HCPCS | Performed by: INTERNAL MEDICINE

## 2020-02-26 PROCEDURE — 99396 PREV VISIT EST AGE 40-64: CPT | Performed by: INTERNAL MEDICINE

## 2020-02-26 RX ORDER — FINASTERIDE 5 MG/1
5 TABLET, FILM COATED ORAL DAILY
Qty: 90 TABLET | Refills: 3 | Status: SHIPPED | OUTPATIENT
Start: 2020-02-26 | End: 2021-06-29

## 2020-02-26 RX ORDER — AMITRIPTYLINE HYDROCHLORIDE 50 MG/1
50 TABLET, FILM COATED ORAL NIGHTLY
Qty: 90 TABLET | Refills: 3 | Status: SHIPPED | OUTPATIENT
Start: 2020-02-26 | End: 2021-06-29

## 2020-02-26 RX ORDER — ALPRAZOLAM 0.25 MG/1
0.25 TABLET ORAL EVERY 6 HOURS PRN
Qty: 30 TABLET | Refills: 1 | Status: SHIPPED | OUTPATIENT
Start: 2020-02-26

## 2020-02-26 NOTE — PROGRESS NOTES
Re Roman is a 52year old male. HPI:   He is here for annual wellness exam.     He is also here for recheck from last visit. He is generally improved compared to last visit with no significant recurrence of GI discomfort.     He was diagnosed about 2 y with exertion  CARDIOVASCULAR: denies chest pain on exertion  GI: denies abdominal pain and denies heartburn  NEURO: denies headaches    EXAM:   /72 (BP Location: Right arm)   Pulse 89   Temp 98.7 °F (37.1 °C) (Oral)   Resp 16   Wt 214 lb (97.1 kg)

## 2020-03-19 ENCOUNTER — APPOINTMENT (OUTPATIENT)
Dept: LAB | Age: 50
End: 2020-03-19
Attending: INTERNAL MEDICINE
Payer: COMMERCIAL

## 2020-03-19 DIAGNOSIS — E78.00 HYPERCHOLESTEREMIA: ICD-10-CM

## 2020-03-19 LAB
BACTERIA UR QL AUTO: NEGATIVE /HPF
BACTERIA UR QL AUTO: NEGATIVE /HPF
BILIRUB UR QL: NEGATIVE
CHOLEST SMN-MCNC: 192 MG/DL (ref ?–200)
CLARITY UR: CLEAR
COLOR UR: YELLOW
COMPLEXED PSA SERPL-MCNC: 0.25 NG/ML (ref ?–4)
GLUCOSE UR-MCNC: NEGATIVE MG/DL
HDLC SERPL-MCNC: 53 MG/DL (ref 40–59)
HGB UR QL STRIP.AUTO: NEGATIVE
HYALINE CASTS #/AREA URNS AUTO: 1 /LPF
KETONES UR-MCNC: NEGATIVE MG/DL
LDLC SERPL CALC-MCNC: 103 MG/DL (ref ?–100)
LEUKOCYTE ESTERASE UR QL STRIP.AUTO: NEGATIVE
NITRITE UR QL STRIP.AUTO: NEGATIVE
NONHDLC SERPL-MCNC: 139 MG/DL (ref ?–130)
PATIENT FASTING Y/N/NP: YES
PH UR: 6 [PH] (ref 5–8)
PROT UR-MCNC: NEGATIVE MG/DL
RBC #/AREA URNS AUTO: 1 /HPF
RBC #/AREA URNS AUTO: <1 /HPF
SP GR UR STRIP: 1.03 (ref 1–1.03)
TRIGL SERPL-MCNC: 180 MG/DL (ref 30–149)
TSI SER-ACNC: 0.87 MIU/ML (ref 0.36–3.74)
UROBILINOGEN UR STRIP-ACNC: <2
VLDLC SERPL CALC-MCNC: 36 MG/DL (ref 0–30)
WBC #/AREA URNS AUTO: 1 /HPF
WBC #/AREA URNS AUTO: 1 /HPF

## 2020-03-19 PROCEDURE — 81015 MICROSCOPIC EXAM OF URINE: CPT

## 2020-03-19 PROCEDURE — 80061 LIPID PANEL: CPT

## 2020-03-19 PROCEDURE — 81001 URINALYSIS AUTO W/SCOPE: CPT

## 2020-03-19 PROCEDURE — 84443 ASSAY THYROID STIM HORMONE: CPT

## 2020-03-19 PROCEDURE — 36415 COLL VENOUS BLD VENIPUNCTURE: CPT

## 2020-03-25 ENCOUNTER — TELEPHONE (OUTPATIENT)
Dept: INTERNAL MEDICINE CLINIC | Facility: CLINIC | Age: 50
End: 2020-03-25

## 2020-03-25 NOTE — TELEPHONE ENCOUNTER
AMELIAI to Dr. Ines Payton; Spoke with Ran Hair to relay MD message below. Pt asks specifics on lab values for Lipid Panel, due to Family Hx of Heart Problems. We discussed changed in values from 2019 to 2020 (Comparison Chart Below).  Pt notes no major changes in diet f

## 2020-03-25 NOTE — TELEPHONE ENCOUNTER
TG only min elevated. Things that raise TG are sugars, excess carbs.  Alcohol and heavy foods such as gravy, etc

## 2020-03-25 NOTE — TELEPHONE ENCOUNTER
I spoke with Nneka Marte and relayed Dr. Martin Gonzalez message. Patient appreciative of the call back.

## 2020-07-21 NOTE — TELEPHONE ENCOUNTER
Health Maintenance Due   Topic Date Due   • DTaP/Tdap/Td Vaccine (1 - Tdap) 07/06/1953   • Hepatitis B Vaccine (1 of 3 - Risk 3-dose series) 07[birth year]06/1961   • Shingles Vaccine (1 of 2) 07/06/1994   • Diabetes Foot Exam  03/01/2020   • Diabetes A1C  06/20/2020   • Medicare Wellness Visit  11/01/2020       Patient is due for topics as listed above but is not proceeding with Immunization(s) Dtap/Tdap/Td, Hep B and Shingles and Diabetes Foot Exam at this time.          Patient called back - new referral entered for [facility] Anxiety

## 2021-04-18 ENCOUNTER — PATIENT MESSAGE (OUTPATIENT)
Dept: INTERNAL MEDICINE CLINIC | Facility: CLINIC | Age: 51
End: 2021-04-18

## 2021-04-19 NOTE — TELEPHONE ENCOUNTER
From: Karla Zamudio  To: Han Viera. Brandon Mendez MD  Sent: 4/18/2021 12:02 PM CDT  Subject: Other    Dear Dr. Maryse Ferguson office,    Attached, please find a photo of my vaccination card indicating I received the J&J COVID-19 vaccine on 3/20/2021.  Please update my file

## 2021-05-31 ENCOUNTER — RECORDS - HEALTHEAST (OUTPATIENT)
Dept: ADMINISTRATIVE | Facility: CLINIC | Age: 51
End: 2021-05-31

## 2021-06-28 NOTE — TELEPHONE ENCOUNTER
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

## 2021-06-29 RX ORDER — AMITRIPTYLINE HYDROCHLORIDE 50 MG/1
50 TABLET, FILM COATED ORAL NIGHTLY
Qty: 90 TABLET | Refills: 0 | Status: SHIPPED | OUTPATIENT
Start: 2021-06-29 | End: 2021-08-27

## 2021-06-29 RX ORDER — FINASTERIDE 5 MG/1
5 TABLET, FILM COATED ORAL DAILY
Qty: 90 TABLET | Refills: 0 | Status: SHIPPED | OUTPATIENT
Start: 2021-06-29 | End: 2021-09-24

## 2021-06-29 NOTE — TELEPHONE ENCOUNTER
Noted, refills sent. Requested Prescriptions     Signed Prescriptions Disp Refills   • FINASTERIDE 5 MG Oral Tab 90 tablet 0     Sig: TAKE 1 TABLET (5 MG TOTAL) BY MOUTH DAILY.      Authorizing Provider: Eileen Hinton     Ordering User: Rosalinda Juan

## 2021-08-04 NOTE — TELEPHONE ENCOUNTER
-Start PT and home exercises  -Ice the knees 2-3 x daily  -Tylenol to be continued  -Follow up in 4 weeks  -Turmeric 1000mg tabs daily  -Glucosamine/Chondroitin 1500/1200mg daily  -If no better, will discuss gel injection Called patient to ask some questions regarding behavioral health referral - referral entered .  Patient instructed that he will be contacted and he verbalized understanding

## 2021-08-24 ENCOUNTER — TELEPHONE (OUTPATIENT)
Dept: INTERNAL MEDICINE CLINIC | Facility: CLINIC | Age: 51
End: 2021-08-24

## 2021-08-24 NOTE — TELEPHONE ENCOUNTER
Patient is calling to discuss going off Amitriptyline  He doesn't feel he needs to be taking this medication anymore  It was originally prescribed by another provider but Dr Jenny Kelly has been prescribing this for him    Please call patient [phone]

## 2021-09-13 RX ORDER — AMITRIPTYLINE HYDROCHLORIDE 50 MG/1
50 TABLET, FILM COATED ORAL NIGHTLY
Qty: 90 TABLET | Refills: 0 | OUTPATIENT
Start: 2021-09-13

## 2021-09-13 NOTE — TELEPHONE ENCOUNTER
See 8/24/2021 telephone encounter.  Refill request denied    Requested Prescriptions     Refused Prescriptions Disp Refills   • AMITRIPTYLINE 50 MG Oral Tab [Pharmacy Med Name: AMITRIPTYLINE HYDROCHLORIDE 50MG TABLET] 90 tablet 0     Sig: TAKE 1 TABLET (50

## 2021-09-17 ENCOUNTER — TELEPHONE (OUTPATIENT)
Dept: INTERNAL MEDICINE CLINIC | Facility: CLINIC | Age: 51
End: 2021-09-17

## 2021-09-20 RX ORDER — AMITRIPTYLINE HYDROCHLORIDE 50 MG/1
50 TABLET, FILM COATED ORAL NIGHTLY
Qty: 90 TABLET | OUTPATIENT
Start: 2021-09-20

## 2021-09-20 NOTE — TELEPHONE ENCOUNTER
Patient was called per request below. No answer. A message was left for patient to call back. Patient is due for an annual physical with Dr Becky Carr.  Patient's last physical was in February 2020, patient did have a physical scheduled with Dr Becky Carr for today,

## 2021-09-20 NOTE — TELEPHONE ENCOUNTER
Patient last saw Bela Hicks on 2/26/2020. Needs appointment for annual PE.  To [facility]  to please call patient and assist with scheduling then back to Rx group for refill

## 2021-09-22 RX ORDER — AMITRIPTYLINE HYDROCHLORIDE 50 MG/1
50 TABLET, FILM COATED ORAL NIGHTLY
Qty: 90 TABLET | Refills: 4 | OUTPATIENT
Start: 2021-09-22

## 2021-09-22 NOTE — TELEPHONE ENCOUNTER
Pt is not able to schedule at this time, going out of town, will call when he returns to schedule  Pt is out of medication  Tasked to [employer]

## 2021-09-22 NOTE — TELEPHONE ENCOUNTER
See 8/24/2021 telephone encounter.  Refill request denied   Requested Prescriptions     Refused Prescriptions Disp Refills   • AMITRIPTYLINE 50 MG Oral Tab [Pharmacy Med Name: AMITRIPTYLINE HYDROCHLORIDE 50MG TABLET] 90 tablet 4     Sig: TAKE 1 TABLET (48 M

## 2021-09-24 RX ORDER — FINASTERIDE 5 MG/1
5 TABLET, FILM COATED ORAL DAILY
Qty: 90 TABLET | Refills: 1 | Status: SHIPPED | OUTPATIENT
Start: 2021-09-24

## 2022-03-14 ENCOUNTER — TELEPHONE (OUTPATIENT)
Dept: INTERNAL MEDICINE CLINIC | Facility: CLINIC | Age: 52
End: 2022-03-14

## 2022-03-17 RX ORDER — FINASTERIDE 5 MG/1
5 TABLET, FILM COATED ORAL DAILY
Qty: 90 TABLET | Status: SHIPPED | OUTPATIENT
Start: 2022-03-17

## 2022-03-17 NOTE — TELEPHONE ENCOUNTER
Dr. Gisel Rodney I called Pt as he was suppose to call you back in September and be seen when he returned from [place]. He states he is still there and we should just cancel his scripts as he can not make a committed time on his return. When I asked if he would consider having lab work done up in [place] he asked if he could call back tomorrow. Said he appreciated your f/u .  Please advise
Patient information on fall and injury prevention

## 2023-07-06 ENCOUNTER — TELEPHONE (OUTPATIENT)
Dept: INTERNAL MEDICINE CLINIC | Facility: CLINIC | Age: 53
End: 2023-07-06

## 2023-07-06 NOTE — TELEPHONE ENCOUNTER
Dr Deirdre Caceres, patient is currently looking for a GI and Cardio physician in [place]. He has every intention in getting those consults done and will give you an update once he has something scheduled.

## 2023-07-06 NOTE — TELEPHONE ENCOUNTER
Please call patient and let him know that when I was finishing my note as it relates to her office visit I did see that he was recommended to have a EGD and colonoscopy but I am not quite sure if he ever had this. I saw notes from [facility] emergency room department and follow-up with some doctors in [place].

## 2024-01-04 ENCOUNTER — TELEPHONE (OUTPATIENT)
Dept: GASTROENTEROLOGY | Facility: CLINIC | Age: 54
End: 2024-01-04
Payer: COMMERCIAL

## 2024-01-04 NOTE — TELEPHONE ENCOUNTER
The patient called to schedule a colonoscopy. Currently no order in Epic.    The patient will request that his PCP fax a referral, fax number provided to the patient.

## 2024-01-05 ENCOUNTER — TELEPHONE (OUTPATIENT)
Dept: GASTROENTEROLOGY | Facility: CLINIC | Age: 54
End: 2024-01-05
Payer: COMMERCIAL

## 2024-01-05 ENCOUNTER — TRANSCRIBE ORDERS (OUTPATIENT)
Dept: GASTROENTEROLOGY | Facility: CLINIC | Age: 54
End: 2024-01-05
Payer: COMMERCIAL

## 2024-01-05 DIAGNOSIS — Z12.11 SCREENING FOR COLON CANCER: Primary | ICD-10-CM

## 2024-01-05 DIAGNOSIS — Z12.11 ENCOUNTER FOR SCREENING COLONOSCOPY: Primary | ICD-10-CM

## 2024-01-05 DIAGNOSIS — Z12.11 SCREEN FOR COLON CANCER: Primary | ICD-10-CM

## 2024-01-05 NOTE — TELEPHONE ENCOUNTER
Pre visit planning completed.      Procedure details:    Patient scheduled for Colonoscopy  on 1/12/24.     Arrival time: 1050. Procedure time 1135    Pre op exam needed? N/A    Facility location: [facility] [place]; [address], [place] [address]    Sedation type: Conscious sedation     Indication for procedure: Screening (however order is for Diagnostic). Will send message to ordering provider to clarify.       Chart review:     Electronic implanted devices? No    Recent diagnosis of diverticulitis within the last 6 weeks? No    Diabetic? No      Medication review:    Anticoagulants? No    NSAIDS? No NSAID medications per patient's medication list.  RN will verify with pre-assessment call.    Other medication HOLDING recommendations:  N/A      Prep for procedure:     Bowel prep recommendation: Standard Miralax   Due to:  standard bowel prep.    Prep instructions sent via saperatec     Radha Campuzano RN  Endoscopy Procedure Pre Assessment RN  [phone] option 4

## 2024-01-05 NOTE — TELEPHONE ENCOUNTER
"Endoscopy Scheduling Screen    Have you had a positive Covid test in the last 14 days?  No    Are you active on MyChart?   Yes    What insurance is in the chart?  Other:  [employer]    Ordering/Referring Provider: Jazmyn Caban   (If ordering provider performs procedure, schedule with ordering provider unless otherwise instructed. )    BMI: There is no height or weight on file to calculate BMI.     6'1 245lbs      Sedation Ordered  moderate sedation.   If patient BMI > 50 do not schedule in [facility].    If patient BMI > 45 do not schedule at [facility].    Are you taking methadone or Suboxone?  No    Are you taking any prescription medications for pain 3 or more times per week?   NO - No RN review required.    Do you have a history of malignant hyperthermia or adverse reaction to anesthesia?  No    (Females) Are you currently pregnant?   No     Have you been diagnosed or told you have pulmonary hypertension?   No    Do you have an LVAD?  No    Have you been told you have moderate to severe sleep apnea?  No    Have you been told you have COPD, asthma, or any other lung disease?  No    Do you have any heart conditions?  No     Have you ever had an organ transplant?   No    Have you ever had or are you awaiting a heart or lung transplant?   No    Have you had a stroke or transient ischemic attack (TIA aka \"mini stroke\" in the last 6 months?   No    Have you been diagnosed with or been told you have cirrhosis of the liver?   No    Are you currently on dialysis?   No    Do you need assistance transferring?   No    BMI: There is no height or weight on file to calculate BMI.     Is patients BMI > 40 and scheduling location [facility]?  No    Do you take an injectable medication for weight loss or diabetes (excluding insulin)?  No    Do you take the medication Naltrexone?  No    Do you take blood thinners?  No       Prep   Are you currently on dialysis or do you have chronic kidney disease?  No    Do you have a diagnosis of diabetes?  No    Do " you have a diagnosis of cystic fibrosis (CF)?  No    On a regular basis do you go 3 -5 days between bowel movements?  No    BMI > 40?  No    Preferred Pharmacy:  [facility] on [address]      Final Scheduling Details   Colonoscopy prep sent?  Standard MiraLAX    Procedure scheduled  Colonoscopy    Surgeon:  ivette     Date of procedure:  1/12     Pre-OP / PAC:   No - Not required for this site.    Location  [facility] - Per order.    Sedation   Moderate Sedation - Per order.      Patient Reminders:   You will receive a call from a Nurse to review instructions and health history.  This assessment must be completed prior to your procedure.  Failure to complete the Nurse assessment may result in the procedure being cancelled.      On the day of your procedure, please designate an adult(s) who can drive you home stay with you for the next 24 hours. The medicines used in the exam will make you sleepy. You will not be able to drive.      You cannot take public transportation, ride share services, or non-medical taxi service without a responsible caregiver.  Medical transport services are allowed with the requirement that a responsible caregiver will receive you at your destination.  We require that drivers and caregivers are confirmed prior to your procedure.

## 2024-01-08 NOTE — TELEPHONE ENCOUNTER
Order switched to screening colonoscopy.     Pre assessment completed for upcoming procedure.   (Please see previous telephone encounter notes for complete details)      Procedure details:    Arrival time and facility location reviewed.    Pre op exam needed? N/A    Designated  policy reviewed. Instructed to have someone stay 6 hours post procedure.     COVID policy reviewed.      Medication review:    Medications reviewed. Please see supporting documentation below. Holding recommendations discussed (if applicable).       Prep for procedure:     Procedure prep instructions reviewed.        Additional information needed?  N/A      Patient  verbalized understanding and had no questions or concerns at this time.      Radha Campuzano RN  Endoscopy Procedure Pre Assessment RN  [phone] option 4

## 2024-01-09 RX ORDER — BISACODYL 5 MG/1
TABLET, DELAYED RELEASE ORAL
Qty: 4 TABLET | Refills: 0 | Status: SHIPPED | OUTPATIENT
Start: 2024-01-09

## 2024-01-09 NOTE — TELEPHONE ENCOUNTER
Patient called wondering if this prep will be sufficient as he reported poor prep in the past using golytely. This time patient was sent standard miralax.   Writer discussed with patient and will switch to extended golytely prep.      Sent prescription to [facility] and sent new instructions to [facility].      Patient had no further question and understood prep instructions.     Lina Perez RN

## 2024-01-12 ENCOUNTER — HOSPITAL ENCOUNTER (OUTPATIENT)
Facility: AMBULATORY SURGERY CENTER | Age: 54
Discharge: HOME | End: 2024-01-12
Attending: INTERNAL MEDICINE | Admitting: INTERNAL MEDICINE
Payer: COMMERCIAL

## 2024-01-12 VITALS
DIASTOLIC BLOOD PRESSURE: 95 MMHG | OXYGEN SATURATION: 96 % | RESPIRATION RATE: 16 BRPM | SYSTOLIC BLOOD PRESSURE: 117 MMHG | TEMPERATURE: 97.8 F | HEART RATE: 83 BPM

## 2024-01-12 LAB — COLONOSCOPY: NORMAL

## 2024-01-12 PROCEDURE — 45378 DIAGNOSTIC COLONOSCOPY: CPT

## 2024-01-12 PROCEDURE — G8907 PT DOC NO EVENTS ON DISCHARG: HCPCS

## 2024-01-12 PROCEDURE — G8918 PT W/O PREOP ORDER IV AB PRO: HCPCS

## 2024-01-12 RX ORDER — PROCHLORPERAZINE MALEATE 10 MG
10 TABLET ORAL EVERY 6 HOURS PRN
Status: DISCONTINUED | OUTPATIENT
Start: 2024-01-12 | End: 2024-01-13 | Stop reason: HOSPADM

## 2024-01-12 RX ORDER — ONDANSETRON 2 MG/ML
4 INJECTION INTRAMUSCULAR; INTRAVENOUS
Status: COMPLETED | OUTPATIENT
Start: 2024-01-12 | End: 2024-01-12

## 2024-01-12 RX ORDER — ONDANSETRON 2 MG/ML
4 INJECTION INTRAMUSCULAR; INTRAVENOUS EVERY 6 HOURS PRN
Status: DISCONTINUED | OUTPATIENT
Start: 2024-01-12 | End: 2024-01-13 | Stop reason: HOSPADM

## 2024-01-12 RX ORDER — NALOXONE HYDROCHLORIDE 0.4 MG/ML
0.4 INJECTION, SOLUTION INTRAMUSCULAR; INTRAVENOUS; SUBCUTANEOUS
Status: DISCONTINUED | OUTPATIENT
Start: 2024-01-12 | End: 2024-01-13 | Stop reason: HOSPADM

## 2024-01-12 RX ORDER — NALOXONE HYDROCHLORIDE 0.4 MG/ML
0.2 INJECTION, SOLUTION INTRAMUSCULAR; INTRAVENOUS; SUBCUTANEOUS
Status: DISCONTINUED | OUTPATIENT
Start: 2024-01-12 | End: 2024-01-13 | Stop reason: HOSPADM

## 2024-01-12 RX ORDER — FENTANYL CITRATE 50 UG/ML
INJECTION, SOLUTION INTRAMUSCULAR; INTRAVENOUS PRN
Status: DISCONTINUED | OUTPATIENT
Start: 2024-01-12 | End: 2024-01-12 | Stop reason: HOSPADM

## 2024-01-12 RX ORDER — ONDANSETRON 4 MG/1
4 TABLET, ORALLY DISINTEGRATING ORAL EVERY 6 HOURS PRN
Status: DISCONTINUED | OUTPATIENT
Start: 2024-01-12 | End: 2024-01-13 | Stop reason: HOSPADM

## 2024-01-12 RX ORDER — LIDOCAINE 40 MG/G
CREAM TOPICAL
Status: DISCONTINUED | OUTPATIENT
Start: 2024-01-12 | End: 2024-01-13 | Stop reason: HOSPADM

## 2024-01-12 RX ORDER — FLUMAZENIL 0.1 MG/ML
0.2 INJECTION, SOLUTION INTRAVENOUS
Status: DISCONTINUED | OUTPATIENT
Start: 2024-01-12 | End: 2024-01-13 | Stop reason: HOSPADM

## 2024-01-12 RX ORDER — CARBIDOPA AND LEVODOPA 25; 100 MG/1; MG/1
1 TABLET ORAL AT BEDTIME
COMMUNITY
Start: 2022-03-17

## 2024-01-12 RX ADMIN — ONDANSETRON 4 MG: 2 INJECTION INTRAMUSCULAR; INTRAVENOUS at 11:16

## 2024-01-12 NOTE — H&P
[facility]esthesia Pre-op History and Physical    Richard Whiting IV MRN# [id number]   Age: 53 year old YOB: 1970            Date of Exam 1/12/2024         Primary care provider: No Ref-Primary, Physician         Chief Complaint and/or Reason for Procedure:     CRC screening - paternal grandfather with colon cancer, sister with polyps age <60         Active problem list:     There are no problems to display for this patient.           Medications (include herbals and vitamins):   Any Plavix use in the last 7 days? No     Current Outpatient Medications   Medication Sig    carbidopa-levodopa (SINEMET)  MG tablet Take 1 tablet by mouth at bedtime    bisacodyl (DULCOLAX) 5 MG EC tablet 2 days prior to procedure, take 2 tablets at 4 pm. 1 day prior to procedure, take 2 tablets at 4 pm. For additional instructions refer to your colonoscopy prep instructions.    polyethylene glycol (GOLYTELY) 236 g suspension 2 days prior at 5pm, mix and drink half of a jug of Golytely. Drink an 8 oz. glass of Golytely every 15 minutes until half of the jug is gone. Place remainder of Golytely in the refrigerator. 1 day prior at 5 pm, drink the 2nd half of a jug of Golytely bowel prep. 6 hours before your check-in time, drink an 8 oz. glass of Golytely every 15 minutes until half of the 2nd jug of Golytely is gone. Discard remainder of second jug.     Current Facility-Administered Medications   Medication    lidocaine (LMX4) kit    lidocaine 1 % 0.1-1 mL    sodium chloride (PF) 0.9% PF flush 3 mL    sodium chloride (PF) 0.9% PF flush 3 mL             Allergies:      Allergies   Allergen Reactions    Erythromycin Base [Erythromycin] Unknown     Allergy to Latex? No  Allergy to tape?   No  Intolerances:             Physical Exam:   All vitals have been reviewed  Patient Vitals for the past 8 hrs:   BP Temp Temp src Pulse Resp SpO2   01/12/24 1128 (!) 145/76 97.8  F (36.6  C) Temporal 75 16 98 %   01/12/24 1110  128/84 97  F (36.1  C) Temporal 77 16 96 %     No intake/output data recorded.  Lungs:   No increased work of breathing, good air exchange, clear to auscultation bilaterally, no crackles or wheezing     Cardiovascular:   normal S1 and S2             Lab / Radiology Results:            Anesthetic risk and/or ASA classification:     2  Bonita Salazar DO

## 2024-02-19 ENCOUNTER — TELEPHONE (OUTPATIENT)
Dept: GASTROENTEROLOGY | Facility: CLINIC | Age: 54
End: 2024-02-19
Payer: COMMERCIAL

## 2024-02-19 NOTE — TELEPHONE ENCOUNTER
[facility] Call Center    Phone Message    May a detailed message be left on voicemail: yes     Reason for Call: Other: Richard is calling in asking for a call back from his care team. Pt states that he would like to speak with Dr. Salazar's team about digestive issues he has been having since his colonoscopy. Please call back to discuss.     Action Taken: Message routed to:  [facility] ([facility] GI    Travel Screening: Not Applicable

## 2024-02-20 NOTE — TELEPHONE ENCOUNTER
Call back to patient who is reporting some digestive issues since his colonoscopy. Writer advised to reach out to his primary care physician as he currently does not see a GI provider. Patient verbalized understanding.    Jennifer Meng RN

## 2024-03-09 ENCOUNTER — HEALTH MAINTENANCE LETTER (OUTPATIENT)
Age: 54
End: 2024-03-09

## 2024-12-09 ENCOUNTER — TELEPHONE (OUTPATIENT)
Dept: INTERNAL MEDICINE CLINIC | Facility: CLINIC | Age: 54
End: 2024-12-09

## 2024-12-09 NOTE — TELEPHONE ENCOUNTER
Patient is calling in 2017 Dr Vivar prescribed Carbidopa to help him PLMD for his sleep.    Dr Vivar no longer has the records that he is the original prescriber, they are in a storage area but are not willing to go to the storage area to access the records.  Dr Vivar will no longer prescribe the medication unless he can show proof that he originally prescribed the medication.    Can nursing look in the chart to see if there is documentation that shows this medication was prescribed by Dr Vivar    Phone [phone]

## 2024-12-09 NOTE — TELEPHONE ENCOUNTER
Patient called and states Dr Vivar prescribed Carbidopa in 2015 and requesting records stating that, since Dr Vivar in recent visit stated he was not sure he even prescribed medication. Patient states Dr Vivar did not have any records in Epic and was paper records at the time and could not retrieve from storage. This nurse did not see anything in chart that has Dr Vivar starting Carbidopa but was message with Dr Pandey refilling it.

## 2025-03-16 ENCOUNTER — HEALTH MAINTENANCE LETTER (OUTPATIENT)
Age: 55
End: 2025-03-16

## (undated) DEVICE — KIT ENDO FIRST STEP DISINFECTANT 200ML W/POUCH EP-4

## (undated) DEVICE — PREP CHLORAPREP 26ML TINTED ORANGE  260815

## (undated) DEVICE — PAD CHUX UNDERPAD 23X24" 7136

## (undated) RX ORDER — ONDANSETRON 2 MG/ML
INJECTION INTRAMUSCULAR; INTRAVENOUS
Status: DISPENSED
Start: 2024-01-12

## (undated) RX ORDER — FENTANYL CITRATE 50 UG/ML
INJECTION, SOLUTION INTRAMUSCULAR; INTRAVENOUS
Status: DISPENSED
Start: 2024-01-12